# Patient Record
Sex: MALE | Race: WHITE
[De-identification: names, ages, dates, MRNs, and addresses within clinical notes are randomized per-mention and may not be internally consistent; named-entity substitution may affect disease eponyms.]

---

## 2018-06-20 PROBLEM — Z00.00 ENCOUNTER FOR PREVENTIVE HEALTH EXAMINATION: Status: ACTIVE | Noted: 2018-06-20

## 2018-07-02 ENCOUNTER — APPOINTMENT (OUTPATIENT)
Dept: ORTHOPEDIC SURGERY | Facility: CLINIC | Age: 58
End: 2018-07-02
Payer: MEDICAID

## 2018-07-02 DIAGNOSIS — Z78.9 OTHER SPECIFIED HEALTH STATUS: ICD-10-CM

## 2018-07-02 PROCEDURE — 99204 OFFICE O/P NEW MOD 45 MIN: CPT | Mod: 25

## 2018-07-02 PROCEDURE — 20612 ASPIRATE/INJ GANGLION CYST: CPT | Mod: LT

## 2019-01-14 ENCOUNTER — APPOINTMENT (OUTPATIENT)
Dept: ORTHOPEDIC SURGERY | Facility: CLINIC | Age: 59
End: 2019-01-14
Payer: MEDICAID

## 2019-01-14 VITALS
HEIGHT: 66 IN | BODY MASS INDEX: 29.73 KG/M2 | SYSTOLIC BLOOD PRESSURE: 158 MMHG | HEART RATE: 97 BPM | DIASTOLIC BLOOD PRESSURE: 97 MMHG | WEIGHT: 185 LBS

## 2019-01-14 PROCEDURE — 20605 DRAIN/INJ JOINT/BURSA W/O US: CPT | Mod: LT

## 2019-01-14 PROCEDURE — 99213 OFFICE O/P EST LOW 20 MIN: CPT | Mod: 25

## 2019-01-14 NOTE — PHYSICAL EXAM
[de-identified] : Constitutional: Well-nourished, well-developed, No acute distress\par Respiratory:  Good respiratory effort, no SOB\par Lymphatic: No regional lymphadenopathy, no lymphedema\par Psychiatric: Pleasant and normal affect, alert and oriented x3\par Skin: Clean dry and intact B/L UE/LE\par Musculoskeletal: normal except where as noted in regional exam\par \par Left Wrist:\par APPEARANCE: Positive well-circumscribed volar/radial wrist swelling compatible with ganglion cyst.  no marked deformities or malalignment\par POSITIVE TENDERNESS: Minimal tenderness to palpation of ganglion cyst\par NONTENDER: snuffbox, scapholunate, DRUJ, TFCC, radial/ulnar styloid, CMC, and MCP joints.\par ROM: full & painless, although full extension recreates mild pain in the volar/radial wrist\par RESISTIVE TESTING: painless resisted wrist flex/ext, and radial/ulnar deviation. \par SPECIAL TESTS: neg Finkelstein's. neg Phalen's. neg reverse Phalen's. neg Sousa's. neg christen test. neg TFCC grind. neg tinel's at the carpal tunnel\par Vasc: 2+ radial pulse\par Neuro: AIN, PIN, Ulnar nerve intact to motor\par Sensation: Intact to light touch throughout\par

## 2019-01-14 NOTE — HISTORY OF PRESENT ILLNESS
[de-identified] : Patient is here for left wrist pain follow up. He states that his left wrist ganglion cyst returned about 3 months ago and is bothering him. He has not done anything to treat it and states that there are no new symptoms with the return of his cyst.

## 2019-01-14 NOTE — DISCUSSION/SUMMARY
[de-identified] : Patient was seen today for continued management of left volar/radial ganglion cyst. We attempted aspiration today, but no fluid was able to be aspirated at this time. The cyst is either septated, or the fluid is to congeal for aspiration via 18-gauge needle. At this time I recommend patient followup with my hand surgical associates to discuss surgical excision of this cyst.  Patient appreciates and agrees with current plan.\par \par This note was generated using dragon medical dictation software.  A reasonable effort has been made for proofreading its contents, but typos may still remain.  If there are any questions or points of clarification needed please notify my office.

## 2019-01-14 NOTE — PROCEDURE
[de-identified] : Aspiration: Left Wrist.\par Indication: Ganglion Cyst.\par \par A discussion was had with the patient regarding this procedure and all questions were answered. All risks, benefits and alternatives were discussed. These included but were not limited to bleeding, infection, allergic reaction and reaccumulation of fluid. A timeout was done to ensure correct side and pt agreed to the procedure.  Betadine was used to sterilize and prep the area, and alcohol was used to clean the skin over the volar/radial wrist. Ethyl chloride spray was then used as a topical anesthetic. A 25G needle was used to inject 0.2cc of 1% lidocaine without epi into the skin superficial to the cyst.  An 18-gauge needle was used to aspirate ZERO cc of clear gelatinous fluid from the cyst without complication. A sterile bandage was then applied. The patient tolerated the procedure well.\par \par \par

## 2019-01-31 ENCOUNTER — APPOINTMENT (OUTPATIENT)
Dept: ORTHOPEDIC SURGERY | Facility: CLINIC | Age: 59
End: 2019-01-31
Payer: MEDICAID

## 2019-01-31 VITALS
DIASTOLIC BLOOD PRESSURE: 107 MMHG | HEIGHT: 66 IN | HEART RATE: 56 BPM | BODY MASS INDEX: 31.34 KG/M2 | SYSTOLIC BLOOD PRESSURE: 167 MMHG | WEIGHT: 195 LBS

## 2019-01-31 DIAGNOSIS — M67.432 GANGLION, LEFT WRIST: ICD-10-CM

## 2019-01-31 PROCEDURE — 99214 OFFICE O/P EST MOD 30 MIN: CPT

## 2019-02-12 ENCOUNTER — APPOINTMENT (OUTPATIENT)
Dept: ORTHOPEDIC SURGERY | Facility: CLINIC | Age: 59
End: 2019-02-12

## 2019-03-21 ENCOUNTER — APPOINTMENT (OUTPATIENT)
Dept: ORTHOPEDIC SURGERY | Facility: CLINIC | Age: 59
End: 2019-03-21

## 2019-05-09 ENCOUNTER — APPOINTMENT (OUTPATIENT)
Dept: ORTHOPEDIC SURGERY | Facility: CLINIC | Age: 59
End: 2019-05-09